# Patient Record
Sex: MALE | Race: WHITE | Employment: FULL TIME | URBAN - METROPOLITAN AREA
[De-identification: names, ages, dates, MRNs, and addresses within clinical notes are randomized per-mention and may not be internally consistent; named-entity substitution may affect disease eponyms.]

---

## 2020-05-08 ENCOUNTER — HOSPITAL ENCOUNTER (EMERGENCY)
Age: 20
Discharge: HOME | End: 2020-05-08
Attending: EMERGENCY MEDICINE
Payer: COMMERCIAL

## 2020-05-08 VITALS
HEIGHT: 76 IN | SYSTOLIC BLOOD PRESSURE: 131 MMHG | HEART RATE: 75 BPM | RESPIRATION RATE: 16 BRPM | DIASTOLIC BLOOD PRESSURE: 64 MMHG | WEIGHT: 280 LBS | BODY MASS INDEX: 34.1 KG/M2 | OXYGEN SATURATION: 100 % | TEMPERATURE: 98 F

## 2020-05-08 PROCEDURE — 90715 TDAP VACCINE 7 YRS/> IM: CPT | Performed by: EMERGENCY MEDICINE

## 2020-05-08 PROCEDURE — 99282 EMERGENCY DEPT VISIT SF MDM: CPT

## 2020-05-08 PROCEDURE — 90471 IMMUNIZATION ADMIN: CPT | Performed by: EMERGENCY MEDICINE

## 2020-05-08 PROCEDURE — 12002 RPR S/N/AX/GEN/TRNK2.6-7.5CM: CPT

## 2020-05-08 PROCEDURE — 6360000002 HC RX W HCPCS: Performed by: EMERGENCY MEDICINE

## 2020-05-08 RX ADMIN — TETANUS TOXOID, REDUCED DIPHTHERIA TOXOID AND ACELLULAR PERTUSSIS VACCINE, ADSORBED 0.5 ML: 5; 2.5; 8; 8; 2.5 SUSPENSION INTRAMUSCULAR at 09:33

## 2020-05-08 ASSESSMENT — PAIN SCALES - GENERAL: PAINLEVEL_OUTOF10: 0

## 2020-05-08 NOTE — ED PROVIDER NOTES
CHIEF COMPLAINT  Laceration (cut right ring finger on window glass. . last tetnus was at 5 yrs old)      [facility]  Nadege Frank  is a 21 y.o. male who presents to the ED at via private vehicle complaining of right ring finger laceration. Patient reports that he works for a [employer]. While at work today he had a laceration of his right ring finger. Patient reports moderate bleeding and mild discomfort. No numbness, or tingling. Tetanus booster is not currently up-to-date. There are no other complaints, modifying factors or associated symptoms. Nursing notes reviewed. Past medical history:  has no past medical history on file. Past surgical history:  has a past surgical history that includes Ureter surgery. Home medications:   Prior to Admission medications    Not on File       No Known Allergies    Social history:  reports that he has never smoked. He has never used smokeless tobacco. He reports that he does not drink alcohol or use drugs. Family history:  History reviewed. No pertinent family history. REVIEW OF SYSTEMS  6 systems reviewed, pertinent positives per HPI otherwise noted to be negative    PHYSICAL EXAM  Vitals:    05/08/20 0914   BP: 131/64   Pulse: 75   Resp: 16   Temp: 98 °F (36.7 °C)   SpO2: 100%             GENERAL: Patient is well-developed, well-nourished,  no acute distress. Mild apparent discomfort. Non toxic appearing. HEENT:  Normocephalic, atraumatic. PERRL. Conjunctiva appear normal.  External ears are normal.  MMM  NECK: Supple with normal ROM. Trachea midline  LUNGS:  Normal work of breathing. Speaking comfortably in full sentences. EXTREMITIES: 2+ distal pulses w/o edema. MUSCULOSKELETAL:  Atraumatic extremities with normal ROM grossly. No obvious bony deformities. SKIN: Warm/dry. No rashes/lesions noted. PSYCHIATRIC: Patient is alert and oriented with normal affect  NEUROLOGIC: Cranial nerves grossly intact.  Moves